# Patient Record
Sex: FEMALE | Race: WHITE
[De-identification: names, ages, dates, MRNs, and addresses within clinical notes are randomized per-mention and may not be internally consistent; named-entity substitution may affect disease eponyms.]

---

## 2017-01-27 ENCOUNTER — HOSPITAL ENCOUNTER (EMERGENCY)
Dept: HOSPITAL 53 - M ED | Age: 47
Discharge: HOME | End: 2017-01-27
Payer: COMMERCIAL

## 2017-01-27 DIAGNOSIS — S01.511A: Primary | ICD-10-CM

## 2017-01-27 DIAGNOSIS — I10: ICD-10-CM
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
## 2017-01-27 DIAGNOSIS — Z79.899: ICD-10-CM

## 2017-01-27 DIAGNOSIS — Z88.1: ICD-10-CM
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
## 2017-01-27 DIAGNOSIS — F32.9: ICD-10-CM
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
## 2017-01-27 NOTE — EDDOCDS
Physician Documentation                                                                           

[facility]                                                                         

Name: Leatha Iglesias                                                                             

Age: 46 yrs                                                                                       

Sex: Female                                                                                       

: 1970                                                                                   

MRN: [id number]                                                                                     

Arrival Date: 2017                                                                          

Time: 21:06                                                                                       

Account#: [id number]                                                                              

Bed TR7                                                                                           

Private MD: Ann Leonard E                                                                       

Disposition:                                                                                      

17 21:37 Discharged to Home/Self Care. Impression: Laceration without foreign body of       

lip, Bitten by dog - PUPPY.                                                                     

- Condition is Stable.                                                                            

- Discharge Instructions: Animal Bite, Easy-to-Read, Facial Laceration, Easy-to-Read.             

                                                                                                  

- Medication Reconciliation, Local Pharmacy Hours form.                                           

- Follow up: Ann Leonard; When: 2 - 3 days; Reason: Wound/Symptom Recheck, Further               

diagnostic work-up, Recheck today's complaints, Continuance of care.                            

- Problem is new.                                                                                 

- Symptoms are unchanged.                                                                         

- Notes: YOU WERE PRESCRIBED ANTIBIOTICS WHEN YOU WERE SEEN AT URGENT CARE. IT IS                 

IMPORTANT THAT YOU TAKE ALL OF THIS MEDICATION AS PRESCRIBED. PLEASE BE CERTAIN TO              

READ AND FOLLOW YOUR DISCHARGE INSTRUCTIONS CAREFULLY.                                          

                                                                                                  

                                                                                                  

Historical:                                                                                       

- Allergies: Clindamycin (Upset stomach);                                                         

- Home Meds:                                                                                      

1. Lexapro Oral 1 tab once daily                                                                

2. meclizine 25 mg Oral tab as needed                                                           

- PMHx: Depression; Hypertension; Migraine Headaches;                                             

- PSHx: none;                                                                                     

- Social history: Smoking status: Patient states was never smoker of tobacco. No                  

barriers to communication noted, The patient speaks fluent English.                             

- Family history: Not pertinent.                                                                  

- : The pt / caregiver states he / she is not on anticoagulants. Home medication list             

is obtained from the patient.                                                                   

- Exposure Risk Screening:: None identified.                                                      

                                                                                                  

                                                                                                  

OB/GYN:                                                                                           

                                                                                             

21:13 LMP N/A - Irregular menses                                                              rs3 

                                                                                                  

Vital Signs:                                                                                      

21:08  / 83; Pulse 83; Resp 18 S; Temp 96.9(O); Pulse Ox 98% on R/A; Weight 120.66 kg / gr2 

      266.01 lbs (R); Height 5 ft. 3 in. (160.02 cm) (R); Pain 3/10;                              

21:08 Body Mass Index 47.12 (120.66 kg, 160.02 cm)                                            gr2 

                                                                                                  

MDM:                                                                                              

21:36 Dressing ordered.                                                                       btw 

21:36 Amoxicillin-Clavulanate 875 mg 1 tabs PO once ordered.                                  btw 

                                                                                                  

Administered Medications:                                                                         

21:48 Drug: Amoxicillin-Clavulanate 1 tabs [amoxicillin 875 mg-potassium clavulanate 125 mg   ld5 

      tablet (1 tabs)] Route: PO;                                                                 

                                                                                                  

                                                                                                  

Signatures:                                                                                       

Belinda BetheaRN                   RN   rs3                                                  

Jan Raymond PA PA   btw                                                  

Ann HartmannRN                      RN   ld5                                                  

                                                                                                  

**************************************************************************************************

MTDD

## 2017-01-27 NOTE — EDDOCDS
Nurse's Notes                                                                                     

[facility]                                                                         

Name: Leatha Iglesias                                                                             

Age: 46 yrs                                                                                       

Sex: Female                                                                                       

: 1970                                                                                   

MRN: [id number]                                                                                     

Arrival Date: 2017                                                                          

Time: 21:06                                                                                       

Account#: [id number]                                                                              

Bed TR7                                                                                           

Private MD: Ann Leonard E                                                                       

Diagnosis: Laceration without foreign body of lip;Bitten by dog-PUPPY                             

                                                                                                  

Presentation:                                                                                     

                                                                                             

21:11 Presenting complaint: Patient states: dog bite to lower lip. Adult Sepsis Screening:    rs3 

      The patient does not have new or worsening altered mentation. Patient's respiratory         

      rate is less than 22. Systolic blood pressure is greater than 100. Patient has a qSOFA      

      score of 0- Negative Sepsis Screen. Suicide/Homicide risk assessment- the patient           

      denies having any suicidal and/or homicidal ideations and does not present with any         

      other emotional, behavioral or mental health complaints.  Status: retired.          

      Transition of care: patient was not received from another setting of care.                  

21:11 Acuity: SHAUNNA Level 4                                                                     rs3 

21:11 Method Of Arrival: Walkin/Carried/Asstd                                                 rs3 

                                                                                                  

Triage Assessment:                                                                                

21:13 General: Appears in no apparent distress. Pain: Location: lower lip. HIV screening NA   rs3 

      for this visit Offered previously.                                                          

                                                                                                  

OB/GYN:                                                                                           

21:13 LMP N/A - Irregular menses                                                              rs3 

                                                                                                  

Historical:                                                                                       

- Allergies: Clindamycin (Upset stomach);                                                         

- Home Meds:                                                                                      

1. Lexapro Oral 1 tab once daily                                                                

2. meclizine 25 mg Oral tab as needed                                                           

- PMHx: Depression; Hypertension; Migraine Headaches;                                             

- PSHx: none;                                                                                     

- Social history: Smoking status: Patient states was never smoker of tobacco. No                  

barriers to communication noted, The patient speaks fluent English.                             

- Family history: Not pertinent.                                                                  

- : The pt / caregiver states he / she is not on anticoagulants. Home medication list             

is obtained from the patient.                                                                   

- Exposure Risk Screening:: None identified.                                                      

                                                                                                  

                                                                                                  

Screenin:48 Screening information is obtained from the patient. Fall risk: No risks identified.     ld5 

      Assistance ADL's: requires no assistance with activities of daily living. Abuse/DV          

      Screen: The patient / caregiver reports he/she is: not in a situation that causes fear,     

      pain or injury. Nutritional screening: No deficits noted. Advance Directives:               

      Currently, there is no health care proxy. home support is adequate.                         

                                                                                                  

Assessment:                                                                                       

21:48 General: Appears in no apparent distress, Behavior is cooperative. Pain: Location:      ld5 

      lower lip. Neurological: Level of Consciousness is awake, alert. Respiratory: Airway is     

      patent Respiratory effort is even, unlabored. Injury Description: Laceration sustained      

      to lower lip is not bleeding.                                                               

                                                                                                  

Vital Signs:                                                                                      

21:08  / 83; Pulse 83; Resp 18 S; Temp 96.9(O); Pulse Ox 98% on R/A; Weight 120.66 kg   gr2 

      (R); Height 5 ft. 3 in. (160.02 cm) (R); Pain 3/10;                                         

21:08 Body Mass Index 47.12 (120.66 kg, 160.02 cm)                                            gr2 

                                                                                                  

Vitals:                                                                                           

21:08 Log In Time: 2017 at 21:08.                                                 gr2 

                                                                                                  

ED Course:                                                                                        

21:08 Patient visited by Kali Dukes.                                                   gr2 

21:08 Ann Leonard is Private Physician.                                                      gr2 

21:08 Patient moved to Waiting                                                                gr2 

21:09 Patient visited by Kali Dukes.                                                   gr2 

21:09 Patient moved to Pre RCE                                                                gr2 

21:10 Patient visited by Kali Dukes.                                                   gr2 

21:12 Triage Initiated                                                                        rs3 

21:14 Patient moved to Triage 3                                                               rs3 

21:22 Jan Raymond PA is PHCP.                                                         btw 

21:22 Mario Cardoso DO is Attending Physician.                                               btw 

21:22 Patient visited by Jan Raymond PA.                                              btw 

21:37 Ann Leonard is Referral Physician.                                                     btw 

21:48 Patient moved to TR7                                                                    ld5 

21:48 The patient / caregiver is instructed regarding the plan of care and ED course.         ld5 

21:48 No IV's were initiated during this patient's visit. No procedures done that require     ld5 

      assistance. Dressings: wet to dry dressing to lower lip.                                    

21:51 Patient visited by Ann Hartmann RN.                                                  ld5 

                                                                                                  

Administered Medications:                                                                         

21:48 Drug: Amoxicillin-Clavulanate 1 tabs [amoxicillin 875 mg-potassium clavulanate 125 mg   ld5 

      tablet (1 tabs)] Route: PO;                                                                 

                                                                                                  

                                                                                                  

Order Results:                                                                                    

There are currently no results for this order.                                                    

Outcome:                                                                                          

21:37 Discharge ordered by Provider.                                                          btw 

21:48 Discharge Assessment: Patient awake, alert and oriented x 3. No cognitive and/or        ld5 

      functional deficits noted. Patient verbalized understanding of disposition                  

      instructions. patient administered narcotics - no. The following High Risk Discharge        

      criteria are identified: None. Discharged to home ambulatory, with significant other.       

      Condition: stable. Discharge instructions given to patient, significant other,              

      Instructed on discharge instructions, follow up and referral plans. medication usage,       

      wound care, Demonstrated understanding of instructions, medications, Pt was receptive       

      of discharge instructions/ teaching. No special radiology studies were completed.           

      Property :Personal belongings accompany Pt.                                                 

21:51 Patient left the ED.                                                                    ld5 

                                                                                                  

Signatures:                                                                                       

Belinda Bethea,RN                   RN   rs3                                                  

Jan Raymond PA                  PA   kayleyw                                                  

Ann Hartmann RN                      RN   ld5                                                  

Kali Dukes                            gr2                                                  

                                                                                                  

Corrections: (The following items were deleted from the chart)                                    

21:10 21:08  / 83; Pulse 83bpm; Resp 18bpm; Spontaneous; Pulse Ox 98% RA; Temp 96.9F    gr2 

      Oral; 75.3 kg Reported; Height 5 ft. 3 in. Reported; BMI: 29.4; Pain 3/10; gr2              

                                                                                                  

**************************************************************************************************

MTDD

## 2017-01-29 NOTE — EDDOCDS
Nurse's Notes                                                                                     

[facility]                                                                         

Name: Leatha Iglesias                                                                             

Age: 46 yrs                                                                                       

Sex: Female                                                                                       

: 1970                                                                                   

MRN: [id number]                                                                                     

Arrival Date: 2017                                                                          

Time: 21:06                                                                                       

Account#: [id number]                                                                              

Bed TR7                                                                                           

Private MD: Ann Leonard E                                                                       

Diagnosis: Laceration without foreign body of lip;Bitten by dog-PUPPY                             

                                                                                                  

Presentation:                                                                                     

                                                                                             

21:11 Presenting complaint: Patient states: dog bite to lower lip. Adult Sepsis Screening:    rs3 

      The patient does not have new or worsening altered mentation. Patient's respiratory         

      rate is less than 22. Systolic blood pressure is greater than 100. Patient has a qSOFA      

      score of 0- Negative Sepsis Screen. Suicide/Homicide risk assessment- the patient           

      denies having any suicidal and/or homicidal ideations and does not present with any         

      other emotional, behavioral or mental health complaints.  Status: retired.          

      Transition of care: patient was not received from another setting of care.                  

21:11 Acuity: SHAUNNA Level 4                                                                     rs3 

21:11 Method Of Arrival: Walkin/Carried/Asstd                                                 rs3 

                                                                                                  

Triage Assessment:                                                                                

21:13 General: Appears in no apparent distress. Pain: Location: lower lip. HIV screening NA   rs3 

      for this visit Offered previously.                                                          

                                                                                                  

OB/GYN:                                                                                           

21:13 LMP N/A - Irregular menses                                                              rs3 

                                                                                                  

Historical:                                                                                       

- Allergies: Clindamycin (Upset stomach);                                                         

- Home Meds:                                                                                      

1. Lexapro Oral 1 tab once daily                                                                

2. meclizine 25 mg Oral tab as needed                                                           

- PMHx: Depression; Hypertension; Migraine Headaches;                                             

- PSHx: none;                                                                                     

- Social history: Smoking status: Patient states was never smoker of tobacco. No                  

barriers to communication noted, The patient speaks fluent English.                             

- Family history: Not pertinent.                                                                  

- : The pt / caregiver states he / she is not on anticoagulants. Home medication list             

is obtained from the patient.                                                                   

- Exposure Risk Screening:: None identified.                                                      

                                                                                                  

                                                                                                  

Screenin:48 Screening information is obtained from the patient. Fall risk: No risks identified.     ld5 

      Assistance ADL's: requires no assistance with activities of daily living. Abuse/DV          

      Screen: The patient / caregiver reports he/she is: not in a situation that causes fear,     

      pain or injury. Nutritional screening: No deficits noted. Advance Directives:               

      Currently, there is no health care proxy. home support is adequate.                         

                                                                                                  

Assessment:                                                                                       

21:48 General: Appears in no apparent distress, Behavior is cooperative. Pain: Location:      ld5 

      lower lip. Neurological: Level of Consciousness is awake, alert. Respiratory: Airway is     

      patent Respiratory effort is even, unlabored. Injury Description: Laceration sustained      

      to lower lip is not bleeding.                                                               

                                                                                                  

Vital Signs:                                                                                      

21:08  / 83; Pulse 83; Resp 18 S; Temp 96.9(O); Pulse Ox 98% on R/A; Weight 120.66 kg   gr2 

      (R); Height 5 ft. 3 in. (160.02 cm) (R); Pain 3/10;                                         

21:08 Body Mass Index 47.12 (120.66 kg, 160.02 cm)                                            gr2 

                                                                                                  

Vitals:                                                                                           

21:08 Log In Time: 2017 at 21:08.                                                 gr2 

                                                                                                  

ED Course:                                                                                        

21:08 Patient visited by Kali Dukes.                                                   gr2 

21:08 Ann Leonard is Private Physician.                                                      gr2 

21:08 Patient moved to Waiting                                                                gr2 

21:09 Patient visited by Kali Dukes.                                                   gr2 

21:09 Patient moved to Pre RCE                                                                gr2 

21:10 Patient visited by Kali Dukes.                                                   gr2 

21:12 Triage Initiated                                                                        rs3 

21:14 Patient moved to Triage 3                                                               rs3 

21:22 Jan Raymond PA is PHCP.                                                         btw 

21:22 Mario Cardoso DO is Attending Physician.                                               btw 

21:22 Patient visited by Jan Raymond PA.                                              btw 

21:37 Ann Leonard is Referral Physician.                                                     btw 

21:48 Patient moved to TR7                                                                    ld5 

21:48 The patient / caregiver is instructed regarding the plan of care and ED course.         ld5 

21:48 No IV's were initiated during this patient's visit. No procedures done that require     ld5 

      assistance. Dressings: wet to dry dressing to lower lip.                                    

21:51 Patient visited by Ann Hartmann RN.                                                  ld5 

                                                                                             

06:46 T-Sheet-- Draft Copy was scanned into [employer] and attached to record.                   [facility] 

                                                                                                  

Administered Medications:                                                                         

                                                                                             

21:48 Drug: Amoxicillin-Clavulanate 1 tabs [amoxicillin 875 mg-potassium clavulanate 125 mg   ld5 

      tablet (1 tabs)] Route: PO;                                                                 

                                                                                                  

                                                                                                  

Order Results:                                                                                    

There are currently no results for this order.                                                    

Outcome:                                                                                          

21:37 Discharge ordered by Provider.                                                          bt 

21:48 Discharge Assessment: Patient awake, alert and oriented x 3. No cognitive and/or        ld5 

      functional deficits noted. Patient verbalized understanding of disposition                  

      instructions. patient administered narcotics - no. The following High Risk Discharge        

      criteria are identified: None. Discharged to home ambulatory, with significant other.       

      Condition: stable. Discharge instructions given to patient, significant other,              

      Instructed on discharge instructions, follow up and referral plans. medication usage,       

      wound care, Demonstrated understanding of instructions, medications, Pt was receptive       

      of discharge instructions/ teaching. No special radiology studies were completed.           

      Property :Personal belongings accompany Pt.                                                 

21:51 Patient left the ED.                                                                    ld5 

                                                                                                  

Signatures:                                                                                       

Belinda BetheaRN                   RN   rs3                                                  

Jan Raymond PA                  PA   btw                                                  

Ann HartmannRN                      RN   ld5                                                  

Kali Dukes                            gr2                                                  

Radhika Young                                                  

                                                                                                  

Corrections: (The following items were deleted from the chart)                                    

21:10 21:08  / 83; Pulse 83bpm; Resp 18bpm; Spontaneous; Pulse Ox 98% RA; Temp 96.9F    gr2 

      Oral; 75.3 kg Reported; Height 5 ft. 3 in. Reported; BMI: 29.4; Pain 3/10; gr2              

                                                                                                  

**************************************************************************************************



*** Chart Complete ***
MTDD

## 2017-01-29 NOTE — EDDOCDS
Physician Documentation                                                                           

[facility]                                                                         

Name: Leatha Iglesias                                                                             

Age: 46 yrs                                                                                       

Sex: Female                                                                                       

: 1970                                                                                   

MRN: [id number]                                                                                     

Arrival Date: 2017                                                                          

Time: 21:06                                                                                       

Account#: [id number]                                                                              

Bed TR7                                                                                           

Private MD: Ann Leonard E                                                                       

Disposition:                                                                                      

17 21:37 Discharged to Home/Self Care. Impression: Laceration without foreign body of       

lip, Bitten by dog - PUPPY.                                                                     

- Condition is Stable.                                                                            

- Discharge Instructions: Animal Bite, Easy-to-Read, Facial Laceration, Easy-to-Read.             

                                                                                                  

- Medication Reconciliation, Local Pharmacy Hours form.                                           

- Follow up: Ann Leonard; When: 2 - 3 days; Reason: Wound/Symptom Recheck, Further               

diagnostic work-up, Recheck today's complaints, Continuance of care.                            

- Problem is new.                                                                                 

- Symptoms are unchanged.                                                                         

- Notes: YOU WERE PRESCRIBED ANTIBIOTICS WHEN YOU WERE SEEN AT URGENT CARE. IT IS                 

IMPORTANT THAT YOU TAKE ALL OF THIS MEDICATION AS PRESCRIBED. PLEASE BE CERTAIN TO              

READ AND FOLLOW YOUR DISCHARGE INSTRUCTIONS CAREFULLY.                                          

                                                                                                  

                                                                                                  

Historical:                                                                                       

- Allergies: Clindamycin (Upset stomach);                                                         

- Home Meds:                                                                                      

1. Lexapro Oral 1 tab once daily                                                                

2. meclizine 25 mg Oral tab as needed                                                           

- PMHx: Depression; Hypertension; Migraine Headaches;                                             

- PSHx: none;                                                                                     

- Social history: Smoking status: Patient states was never smoker of tobacco. No                  

barriers to communication noted, The patient speaks fluent English.                             

- Family history: Not pertinent.                                                                  

- : The pt / caregiver states he / she is not on anticoagulants. Home medication list             

is obtained from the patient.                                                                   

- Exposure Risk Screening:: None identified.                                                      

                                                                                                  

                                                                                                  

OB/GYN:                                                                                           

                                                                                             

21:13 LMP N/A - Irregular menses                                                              rs3 

                                                                                                  

Vital Signs:                                                                                      

21:08  / 83; Pulse 83; Resp 18 S; Temp 96.9(O); Pulse Ox 98% on R/A; Weight 120.66 kg / gr2 

      266.01 lbs (R); Height 5 ft. 3 in. (160.02 cm) (R); Pain 3/10;                              

21:08 Body Mass Index 47.12 (120.66 kg, 160.02 cm)                                            gr2 

                                                                                                  

MDM:                                                                                              

21:36 Dressing ordered.                                                                       btw 

21:36 Amoxicillin-Clavulanate 875 mg 1 tabs PO once ordered.                                  [facility] 

                                                                                             

06:46 T-Sheet-- Draft Copy was scanned into [employer] and attached to record.                   [facility] 

                                                                                                  

Administered Medications:                                                                         

                                                                                             

21:48 Drug: Amoxicillin-Clavulanate 1 tabs [amoxicillin 875 mg-potassium clavulanate 125 mg   ld5 

      tablet (1 tabs)] Route: PO;                                                                 

                                                                                                  

                                                                                                  

Signatures:                                                                                       

Belinda Bethea RN                   RN   rs3                                                  

Jan Raymond PA                  PA   btw                                                  

Ann Hartmann RN                      RN   ld5                                                  

Radhika Young                               [facility]                                                  

                                                                                                  

The chart was reviewed and I authenticate all verbal orders and agree with the evaluation and 
treatment provided.Attachments:

                                                                                             

06:46 T-Sheet-- Draft Copy                                                                    [facility] 

                                                                                                  

**************************************************************************************************



*** Chart Complete ***
MTDD

## 2017-01-29 NOTE — EDDOCDS
Physician Documentation                                                                           

[facility]                                                                         

Name: Leatha Iglesias                                                                             

Age: 46 yrs                                                                                       

Sex: Female                                                                                       

: 1970                                                                                   

MRN: [id number]                                                                                     

Arrival Date: 2017                                                                          

Time: 21:06                                                                                       

Account#: [id number]                                                                              

Bed TR7                                                                                           

Private MD: Ann Leonard E                                                                       

Disposition:                                                                                      

17 21:37 Discharged to Home/Self Care. Impression: Laceration without foreign body of       

lip, Bitten by dog - PUPPY.                                                                     

- Condition is Stable.                                                                            

- Discharge Instructions: Animal Bite, Easy-to-Read, Facial Laceration, Easy-to-Read.             

                                                                                                  

- Medication Reconciliation, Local Pharmacy Hours form.                                           

- Follow up: Ann Leonard; When: 2 - 3 days; Reason: Wound/Symptom Recheck, Further               

diagnostic work-up, Recheck today's complaints, Continuance of care.                            

- Problem is new.                                                                                 

- Symptoms are unchanged.                                                                         

- Notes: YOU WERE PRESCRIBED ANTIBIOTICS WHEN YOU WERE SEEN AT URGENT CARE. IT IS                 

IMPORTANT THAT YOU TAKE ALL OF THIS MEDICATION AS PRESCRIBED. PLEASE BE CERTAIN TO              

READ AND FOLLOW YOUR DISCHARGE INSTRUCTIONS CAREFULLY.                                          

                                                                                                  

                                                                                                  

Historical:                                                                                       

- Allergies: Clindamycin (Upset stomach);                                                         

- Home Meds:                                                                                      

1. Lexapro Oral 1 tab once daily                                                                

2. meclizine 25 mg Oral tab as needed                                                           

- PMHx: Depression; Hypertension; Migraine Headaches;                                             

- PSHx: none;                                                                                     

- Social history: Smoking status: Patient states was never smoker of tobacco. No                  

barriers to communication noted, The patient speaks fluent English.                             

- Family history: Not pertinent.                                                                  

- : The pt / caregiver states he / she is not on anticoagulants. Home medication list             

is obtained from the patient.                                                                   

- Exposure Risk Screening:: None identified.                                                      

                                                                                                  

                                                                                                  

OB/GYN:                                                                                           

                                                                                             

21:13 LMP N/A - Irregular menses                                                              rs3 

                                                                                                  

Vital Signs:                                                                                      

21:08  / 83; Pulse 83; Resp 18 S; Temp 96.9(O); Pulse Ox 98% on R/A; Weight 120.66 kg / gr2 

      266.01 lbs (R); Height 5 ft. 3 in. (160.02 cm) (R); Pain 3/10;                              

21:08 Body Mass Index 47.12 (120.66 kg, 160.02 cm)                                            gr2 

                                                                                                  

MDM:                                                                                              

21:36 Dressing ordered.                                                                       btw 

21:36 Amoxicillin-Clavulanate 875 mg 1 tabs PO once ordered.                                  [facility] 

                                                                                             

06:46 T-Sheet-- Draft Copy was scanned into [employer] and attached to record.                   [facility] 

                                                                                                  

Administered Medications:                                                                         

                                                                                             

21:48 Drug: Amoxicillin-Clavulanate 1 tabs [amoxicillin 875 mg-potassium clavulanate 125 mg   ld5 

      tablet (1 tabs)] Route: PO;                                                                 

                                                                                                  

                                                                                                  

Signatures:                                                                                       

Belinda Bethea RN                   RN   rs3                                                  

Jan Raymond PA                  PA   btw                                                  

Ann Hartmann RN                      RN   ld5                                                  

Radhika Young                               [facility]                                                  

                                                                                                  

The chart was reviewed and I authenticate all verbal orders and agree with the evaluation and 
treatment provided.Attachments:

                                                                                             

06:46 T-Sheet-- Draft Copy                                                                    [facility] 

                                                                                                  

**************************************************************************************************



*** Chart Complete ***
MTDD

## 2017-09-29 ENCOUNTER — HOSPITAL ENCOUNTER (OUTPATIENT)
Dept: HOSPITAL 53 - M LAB REF | Age: 47
End: 2017-09-29
Attending: INTERNAL MEDICINE
Payer: COMMERCIAL

## 2017-09-29 DIAGNOSIS — M25.50: Primary | ICD-10-CM

## 2017-10-03 LAB
B BURGDOR IGG+IGM SER-ACNC: <0.91 ISR (ref 0–0.9)
B BURGDOR IGM SER IA-ACNC: <0.8 INDEX (ref 0–0.79)

## 2018-03-05 ENCOUNTER — HOSPITAL ENCOUNTER (OUTPATIENT)
Dept: HOSPITAL 53 - M LAB REF | Age: 48
End: 2018-03-05
Attending: INTERNAL MEDICINE
Payer: COMMERCIAL

## 2018-03-05 DIAGNOSIS — D50.9: Primary | ICD-10-CM

## 2018-03-05 LAB
FERRITIN: 73 NG/ML (ref 8–252)
IRON (FE): 30 UG/DL (ref 50–170)
IRON SATN MFR SERPL: 9.4 % (ref 13.2–45)
TOTAL IRON BINDING CAPACITY: 320 UG/DL (ref 250–450)

## 2018-06-10 ENCOUNTER — HOSPITAL ENCOUNTER (EMERGENCY)
Dept: HOSPITAL 53 - M ED | Age: 48
Discharge: HOME | End: 2018-06-10
Payer: COMMERCIAL

## 2018-06-10 DIAGNOSIS — Z79.899: ICD-10-CM

## 2018-06-10 DIAGNOSIS — Z88.1: ICD-10-CM

## 2018-06-10 DIAGNOSIS — B34.9: Primary | ICD-10-CM

## 2018-06-10 LAB
ALBUMIN/GLOBULIN RATIO: 0.78 (ref 1–1.93)
ALBUMIN: 3.5 GM/DL (ref 3.2–5.2)
ALKALINE PHOSPHATASE: 103 U/L (ref 45–117)
ALT SERPL W P-5'-P-CCNC: 32 U/L (ref 12–78)
ANION GAP: 8 MEQ/L (ref 8–16)
AST SERPL-CCNC: 18 U/L (ref 7–37)
BASO #: 0 10^3/UL (ref 0–0.2)
BASO %: 0.2 % (ref 0–1)
BILIRUB CONJ SERPL-MCNC: 0.2 MG/DL (ref 0–0.2)
BILIRUBIN,TOTAL: 0.9 MG/DL (ref 0.2–1)
BLOOD UREA NITROGEN: 10 MG/DL (ref 7–18)
CALCIUM LEVEL: 8.6 MG/DL (ref 8.5–10.1)
CARBON DIOXIDE LEVEL: 29 MEQ/L (ref 21–32)
CHLORIDE LEVEL: 104 MEQ/L (ref 98–107)
CREATININE FOR GFR: 0.96 MG/DL (ref 0.55–1.3)
CRP SERPL-MCNC: 2.02 MG/DL (ref 0–0.3)
EOS #: 0 10^3/UL (ref 0–0.5)
EOSINOPHIL NFR BLD AUTO: 0.3 % (ref 0–3)
ERYTHROCYTE SEDIMENTATION RATE: 44 MM/HR (ref 0–20)
GFR SERPL CREATININE-BSD FRML MDRD: > 60 ML/MIN/{1.73_M2} (ref 58–?)
GLUCOSE, FASTING: 110 MG/DL (ref 70–100)
HEMATOCRIT: 41.3 % (ref 36–47)
HEMOGLOBIN: 14.2 G/DL (ref 12–15.5)
IMMATURE GRANULOCYTE %: 0.3 % (ref 0–3)
LYMPH #: 2.1 10^3/UL (ref 1.5–4.5)
LYMPH %: 19 % (ref 24–44)
MEAN CORPUSCULAR HEMOGLOBIN: 28.9 PG (ref 27–33)
MEAN CORPUSCULAR HGB CONC: 34.4 G/DL (ref 32–36.5)
MEAN CORPUSCULAR VOLUME: 83.9 FL (ref 80–96)
MONO #: 0.5 10^3/UL (ref 0–0.8)
MONO %: 4.3 % (ref 0–5)
NEUTROPHILS #: 8.2 10^3/UL (ref 1.8–7.7)
NEUTROPHILS %: 75.9 % (ref 36–66)
NRBC BLD AUTO-RTO: 0 % (ref 0–0)
PLATELET COUNT, AUTOMATED: 341 10^3/UL (ref 150–450)
POTASSIUM SERUM: 3.7 MEQ/L (ref 3.5–5.1)
RED BLOOD COUNT: 4.92 10^6/UL (ref 4–5.4)
RED CELL DISTRIBUTION WIDTH: 12.3 % (ref 11.5–14.5)
SODIUM LEVEL: 141 MEQ/L (ref 136–145)
TOTAL PROTEIN: 8 GM/DL (ref 6.4–8.2)
WHITE BLOOD COUNT: 10.8 10^3/UL (ref 4–10)

## 2018-06-10 PROCEDURE — 80076 HEPATIC FUNCTION PANEL: CPT

## 2018-06-10 RX ADMIN — ACETAMINOPHEN 1 MG: 325 TABLET ORAL at 17:15

## 2018-07-06 ENCOUNTER — HOSPITAL ENCOUNTER (EMERGENCY)
Dept: HOSPITAL 53 - M ED | Age: 48
Discharge: HOME | End: 2018-07-06
Payer: COMMERCIAL

## 2018-07-06 DIAGNOSIS — R93.7: ICD-10-CM

## 2018-07-06 DIAGNOSIS — Z88.1: ICD-10-CM

## 2018-07-06 DIAGNOSIS — K21.9: ICD-10-CM

## 2018-07-06 DIAGNOSIS — Z98.84: ICD-10-CM
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
## 2018-07-06 DIAGNOSIS — M54.9: ICD-10-CM

## 2018-07-06 DIAGNOSIS — Z79.899: ICD-10-CM

## 2018-07-06 DIAGNOSIS — S90.31XA: Primary | ICD-10-CM

## 2018-07-06 DIAGNOSIS — I10: ICD-10-CM

## 2018-07-06 DIAGNOSIS — D64.9: ICD-10-CM
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
## 2018-07-06 PROCEDURE — 73630 X-RAY EXAM OF FOOT: CPT

## 2018-11-14 ENCOUNTER — HOSPITAL ENCOUNTER (EMERGENCY)
Dept: HOSPITAL 53 - M ED | Age: 48
Discharge: HOME | End: 2018-11-14
Payer: COMMERCIAL

## 2018-11-14 DIAGNOSIS — Z79.899: ICD-10-CM

## 2018-11-14 DIAGNOSIS — J45.909: ICD-10-CM

## 2018-11-14 DIAGNOSIS — Z78.0: ICD-10-CM

## 2018-11-14 DIAGNOSIS — K21.9: ICD-10-CM

## 2018-11-14 DIAGNOSIS — Z98.890: ICD-10-CM

## 2018-11-14 DIAGNOSIS — R51: Primary | ICD-10-CM

## 2018-11-14 DIAGNOSIS — Z90.49: ICD-10-CM

## 2018-11-14 DIAGNOSIS — Z88.1: ICD-10-CM

## 2018-11-14 RX ADMIN — SODIUM CHLORIDE 1 MLS/HR: 9 INJECTION, SOLUTION INTRAVENOUS at 17:55

## 2018-11-14 RX ADMIN — METOCLOPRAMIDE 1 MG: 5 INJECTION, SOLUTION INTRAMUSCULAR; INTRAVENOUS at 17:55

## 2018-11-14 RX ADMIN — DIPHENHYDRAMINE HYDROCHLORIDE 1 MG: 50 INJECTION, SOLUTION INTRAMUSCULAR; INTRAVENOUS at 17:55

## 2018-12-17 ENCOUNTER — HOSPITAL ENCOUNTER (EMERGENCY)
Dept: HOSPITAL 53 - M ED | Age: 48
Discharge: HOME | End: 2018-12-17
Payer: COMMERCIAL

## 2018-12-17 VITALS — BODY MASS INDEX: 49 KG/M2 | WEIGHT: 276.57 LBS | HEIGHT: 63 IN

## 2018-12-17 VITALS — DIASTOLIC BLOOD PRESSURE: 84 MMHG | SYSTOLIC BLOOD PRESSURE: 147 MMHG

## 2018-12-17 DIAGNOSIS — D64.9: ICD-10-CM

## 2018-12-17 DIAGNOSIS — Z79.899: ICD-10-CM

## 2018-12-17 DIAGNOSIS — J45.909: ICD-10-CM

## 2018-12-17 DIAGNOSIS — Z88.1: ICD-10-CM

## 2018-12-17 DIAGNOSIS — G43.909: Primary | ICD-10-CM

## 2018-12-17 DIAGNOSIS — I10: ICD-10-CM

## 2018-12-17 PROCEDURE — 96374 THER/PROPH/DIAG INJ IV PUSH: CPT

## 2018-12-17 PROCEDURE — 96375 TX/PRO/DX INJ NEW DRUG ADDON: CPT

## 2018-12-17 PROCEDURE — 99284 EMERGENCY DEPT VISIT MOD MDM: CPT

## 2019-07-26 ENCOUNTER — HOSPITAL ENCOUNTER (OUTPATIENT)
Dept: HOSPITAL 53 - M LAB REF | Age: 49
End: 2019-07-26
Attending: INTERNAL MEDICINE
Payer: COMMERCIAL

## 2019-07-26 DIAGNOSIS — E87.6: Primary | ICD-10-CM

## 2019-07-31 LAB
ALDOST SERPL-MCNC: 6.8 NG/DL (ref 0–30)
RENIN PLAS-CCNC: 0.33 NG/ML/HR (ref 0.17–5.38)